# Patient Record
Sex: MALE | Race: ASIAN | NOT HISPANIC OR LATINO | Employment: STUDENT | URBAN - METROPOLITAN AREA
[De-identification: names, ages, dates, MRNs, and addresses within clinical notes are randomized per-mention and may not be internally consistent; named-entity substitution may affect disease eponyms.]

---

## 2024-07-18 ENCOUNTER — HOSPITAL ENCOUNTER (EMERGENCY)
Facility: HOSPITAL | Age: 29
Discharge: HOME | End: 2024-07-18
Payer: COMMERCIAL

## 2024-07-18 ENCOUNTER — APPOINTMENT (OUTPATIENT)
Dept: RADIOLOGY | Facility: HOSPITAL | Age: 29
End: 2024-07-18
Payer: COMMERCIAL

## 2024-07-18 VITALS
HEART RATE: 67 BPM | HEIGHT: 70 IN | RESPIRATION RATE: 16 BRPM | WEIGHT: 145 LBS | DIASTOLIC BLOOD PRESSURE: 66 MMHG | TEMPERATURE: 97.9 F | BODY MASS INDEX: 20.76 KG/M2 | SYSTOLIC BLOOD PRESSURE: 105 MMHG | OXYGEN SATURATION: 95 %

## 2024-07-18 DIAGNOSIS — S61.211A LACERATION OF LEFT INDEX FINGER WITHOUT FOREIGN BODY WITHOUT DAMAGE TO NAIL, INITIAL ENCOUNTER: Primary | ICD-10-CM

## 2024-07-18 DIAGNOSIS — S61.213A LACERATION OF LEFT MIDDLE FINGER WITHOUT FOREIGN BODY WITHOUT DAMAGE TO NAIL, INITIAL ENCOUNTER: Primary | ICD-10-CM

## 2024-07-18 PROCEDURE — 99283 EMERGENCY DEPT VISIT LOW MDM: CPT | Mod: 25

## 2024-07-18 PROCEDURE — 2500000001 HC RX 250 WO HCPCS SELF ADMINISTERED DRUGS (ALT 637 FOR MEDICARE OP): Performed by: PHYSICIAN ASSISTANT

## 2024-07-18 PROCEDURE — 2500000005 HC RX 250 GENERAL PHARMACY W/O HCPCS: Performed by: PHYSICIAN ASSISTANT

## 2024-07-18 PROCEDURE — 10061 I&D ABSCESS COMP/MULTIPLE: CPT | Performed by: PHYSICIAN ASSISTANT

## 2024-07-18 PROCEDURE — 12001 RPR S/N/AX/GEN/TRNK 2.5CM/<: CPT | Performed by: PHYSICIAN ASSISTANT

## 2024-07-18 PROCEDURE — 99284 EMERGENCY DEPT VISIT MOD MDM: CPT | Performed by: PHYSICIAN ASSISTANT

## 2024-07-18 RX ORDER — LIDOCAINE HYDROCHLORIDE 10 MG/ML
5 INJECTION INFILTRATION; PERINEURAL ONCE
Status: COMPLETED | OUTPATIENT
Start: 2024-07-18 | End: 2024-07-18

## 2024-07-18 RX ORDER — BACITRACIN ZINC 500 UNIT/G
OINTMENT IN PACKET (EA) TOPICAL ONCE
Status: COMPLETED | OUTPATIENT
Start: 2024-07-18 | End: 2024-07-18

## 2024-07-18 RX ORDER — CEPHALEXIN 250 MG/1
500 CAPSULE ORAL ONCE
Status: COMPLETED | OUTPATIENT
Start: 2024-07-18 | End: 2024-07-18

## 2024-07-18 RX ORDER — CEPHALEXIN 500 MG/1
500 CAPSULE ORAL 4 TIMES DAILY
Qty: 27 CAPSULE | Refills: 0 | Status: SHIPPED | OUTPATIENT
Start: 2024-07-18 | End: 2024-07-25

## 2024-07-18 ASSESSMENT — PAIN - FUNCTIONAL ASSESSMENT: PAIN_FUNCTIONAL_ASSESSMENT: 0-10

## 2024-07-18 ASSESSMENT — LIFESTYLE VARIABLES
HAVE YOU EVER FELT YOU SHOULD CUT DOWN ON YOUR DRINKING: NO
TOTAL SCORE: 0
EVER HAD A DRINK FIRST THING IN THE MORNING TO STEADY YOUR NERVES TO GET RID OF A HANGOVER: NO
EVER FELT BAD OR GUILTY ABOUT YOUR DRINKING: NO
HAVE PEOPLE ANNOYED YOU BY CRITICIZING YOUR DRINKING: NO

## 2024-07-18 ASSESSMENT — COLUMBIA-SUICIDE SEVERITY RATING SCALE - C-SSRS
6. HAVE YOU EVER DONE ANYTHING, STARTED TO DO ANYTHING, OR PREPARED TO DO ANYTHING TO END YOUR LIFE?: NO
1. IN THE PAST MONTH, HAVE YOU WISHED YOU WERE DEAD OR WISHED YOU COULD GO TO SLEEP AND NOT WAKE UP?: NO
2. HAVE YOU ACTUALLY HAD ANY THOUGHTS OF KILLING YOURSELF?: NO

## 2024-07-18 ASSESSMENT — PAIN SCALES - GENERAL: PAINLEVEL_OUTOF10: 7

## 2024-07-18 NOTE — Clinical Note
David Crowder was seen and treated in our emergency department on 7/18/2024.  He may return to work on 07/26/2024.       If you have any questions or concerns, please don't hesitate to call.      Kiara Rios PA-C

## 2024-07-19 NOTE — DISCHARGE INSTRUCTIONS
Monitor for signs of infection (increased redness, swelling, warmth), decreased range of motion of finger/hand, fevers/chills.  Sutures may be removed in 7 days.   Please call [phone] for Primary Care referral for follow up appointments.

## 2024-07-19 NOTE — ED PROVIDER NOTES
Emergency Department Encounter  [facility] EMERGENCY MEDICINE    Patient: David Crowder  MRN: [id number]  : 1995  Date of Evaluation: 2024  ED Provider: Kiara Rios PA-C      Chief Complaint       Chief Complaint   Patient presents with    Finger Laceration     HPI    David Crowder is a 29 y.o. male who presents to the emergency department presenting for a left 3rd finger laceration. +RHD. The patient states he dropped an approximately 20 lb kitchen appliance on his finger at 2100 tonight. Patient describes the pain as burning and currently rates his pain as a 6-7/10. Patient denies any numbness, tingling, weakness. He denies shortness of breath, chest pain, and palpitations. He states his most recent tetanus booster was . He denies medical conditions including DMII.     ROS:     Review of Systems  14 systems reviewed and otherwise acutely negative except as in the HPI.    Past History   No past medical history on file.  No past surgical history on file.  Social History     Socioeconomic History    Marital status: Single       Medications/Allergies     Discharge Medication List as of 2024 11:25 PM        No Known Allergies     Physical Exam       ED Triage Vitals [24 2159]   Temperature Heart Rate Respirations BP   36.6 °C (97.9 °F) 67 16 105/66      Pulse Ox Temp src Heart Rate Source Patient Position   95 % -- -- --      BP Location FiO2 (%)     -- --         Physical Exam    Physical Exam:     VS: As documented in the triage note and EMR flowsheet from this visit were reviewed.    Appearance: Alert, oriented, cooperative, in no acute distress. Well nourished & well hydrated.    Skin: Curved 2.5cm laceration on distal left 3rd digit to pulp. Skin appears avulsed and is nonblanchable. Large hematoma present behind laceration.  No retained foreign bodies.  No streaking from the wound.    Neck: Supple, without meningismus. No lymphadenopathy.    Pulmonary:  "Clear bilaterally with good chest wall excursion. No rales, rhonchi or wheezing. No accessory muscle use or stridor. Nonlabored breathing, no supplemental oxygen.     Cardiac: Normal S1, S2 without murmur, rub, gallop or extrasystole.     Musculoskeletal: Spontaneously moving all extremities without limitation. Extremities warm and well-perfused, capillary refill less than 2 seconds. Pulses full and equal. No lower extremity erythema, edema or increased warmth. Calves nontender and without palpable cords. Compartments soft and nontender. No TTP, cyanosis, clubbing or deformity noted.    Neurological: Sensation intact to sharp and dull stimuli to bilateral upper extremities. Equal  strength.    Psychiatric: Appropriate mood and affect. Kempt appearance.       Diagnostics   Not applicable    ED Course   Visit Vitals  /66   Pulse 67   Temp 36.6 °C (97.9 °F)   Resp 16   Ht 1.778 m (5' 10\")   Wt 65.8 kg (145 lb)   SpO2 95%   BMI 20.81 kg/m²   BSA 1.8 m²     Medications   lidocaine (Xylocaine) 10 mg/mL (1 %) injection 5 mL (5 mL infiltration Given 7/18/24 2242)   cephalexin (Keflex) capsule 500 mg (500 mg oral Given 7/18/24 2319)   bacitracin ointment (1 Application Topical Given 7/18/24 2319)       Medical Decision Making     Diagnoses as of 07/26/24 0726   Laceration of left middle finger without foreign body without damage to nail, initial encounter   David Crowder is a 29 year old male presenting to the ED with a finger laceration to his left 3rd finger. Based on mechanism of action, an XR will be obtained to rule out an open fracture.     Patient refused xray, is sure he didn't fracture his finger.  Laceration repair performed, please see my procedure note for full details.  Patient tolerated the procedure well.  Neurovascularly intact both prior to and after suture repair.  Patient had a moderate partial avulsion of the pulp of his finger; there was some concern that the area may not heal well due to " lack of blood flow, however concern for infection precludes removing the piece of skin.  He was started empirically on oral Keflex here for antibiotic coverage.  Referred to orthopedics, counseled on signs and symptoms of infection to return for.      Final Impression      1. Laceration of left middle finger without foreign body without damage to nail, initial encounter          DISPOSITION  Disposition: discharge  Patient condition is: Stable    Comment: Please note this report has been produced using speech recognition software and may contain errors related to that system including errors in grammar, punctuation, and spelling, as well as words and phrases that may be inappropriate.  If there are any questions or concerns please feel free to contact the dictating provider for clarification.    HARSHAL Cox PA-C  07/26/24 0726       Kiara Rios PA-C  07/26/24 0727

## 2024-07-19 NOTE — ED PROCEDURE NOTE
Procedure  Laceration Repair    Performed by: Kiara Rios PA-C  Authorized by: Kiara Rios PA-C    Consent:     Consent obtained:  Verbal    Consent given by:  Patient    Risks, benefits, and alternatives were discussed: yes      Risks discussed:  Infection, poor cosmetic result, poor wound healing and need for additional repair    Alternatives discussed:  No treatment and referral  Universal protocol:     Imaging studies available: no      Patient identity confirmed:  Verbally with patient  Anesthesia:     Anesthesia method:  Local infiltration    Local anesthetic:  Lidocaine 1% w/o epi  Laceration details:     Location:  Finger    Finger location:  L long finger    Length (cm):  2.5    Depth (mm):  0.5  Pre-procedure details:     Patient was prepped and draped in usual sterile fashion: patient refused imaging.  Exploration:     Limited defect created (wound extended): no      Hemostasis achieved with:  Direct pressure    Imaging obtained comment:  Patient refused xray    Wound extent: no foreign bodies/material noted, no muscle damage noted and no nerve damage noted      Contaminated: no    Treatment:     Area cleansed with:  Povidone-iodine    Amount of cleaning:  Standard    Irrigation solution:  Sterile saline    Irrigation volume:  20cc    Irrigation method:  Syringe    Visualized foreign bodies/material removed: no      Debridement:  None    Undermining:  None    Scar revision: no    Skin repair:     Repair method:  Sutures    Suture size:  4-0    Suture material:  Prolene    Suture technique:  Simple interrupted    Number of sutures:  6  Approximation:     Approximation:  Close  Repair type:     Repair type:  Simple  Post-procedure details:     Dressing:  Antibiotic ointment and sterile dressing    Procedure completion:  Tolerated               Kiara Rios PA-C  07/18/24 2217